# Patient Record
Sex: FEMALE | Race: WHITE | NOT HISPANIC OR LATINO | Employment: UNEMPLOYED
[De-identification: names, ages, dates, MRNs, and addresses within clinical notes are randomized per-mention and may not be internally consistent; named-entity substitution may affect disease eponyms.]

---

## 2021-03-09 ENCOUNTER — TELEMEDICINE (OUTPATIENT)
Dept: TELEHEALTH | Facility: TELEMEDICINE | Age: 36
End: 2021-03-09
Payer: OTHER MISCELLANEOUS

## 2021-03-09 DIAGNOSIS — L24.9 IRRITANT CONTACT DERMATITIS, UNSPECIFIED TRIGGER: ICD-10-CM

## 2021-03-09 PROBLEM — F32.A DEPRESSION: Status: ACTIVE | Noted: 2021-03-09

## 2021-03-09 PROCEDURE — 99203 OFFICE O/P NEW LOW 30 MIN: CPT | Mod: 95,CR | Performed by: PHYSICIAN ASSISTANT

## 2021-03-09 RX ORDER — TRIAMCINOLONE ACETONIDE 1 MG/G
CREAM TOPICAL
Qty: 28.4 G | Refills: 1 | Status: CANCELLED | OUTPATIENT
Start: 2021-03-09

## 2021-03-09 RX ORDER — DESOXIMETASONE 2.5 MG/G
CREAM TOPICAL
Qty: 60 G | Refills: 1 | Status: SHIPPED | OUTPATIENT
Start: 2021-03-09

## 2021-03-09 NOTE — PROGRESS NOTES
Virtual Visit: New Patient   This visit was conducted via Zoom using secure and encrypted videoconferencing technology. The patient was in a private location in the state of [place].    The patient's identity was confirmed and verbal consent was obtained for this virtual visit.    Subjective:     CC: Dry, cracking skin to hands    Nita Connell is a 35 y.o. female presenting to establish care and to discuss the evaluation and management of:    Patient contacts clinic for medication refill noting many years of symptoms of dermatitis to hands.  She describes unknown triggers that she associates with dry scaling itchy skin various areas of hands.  She states this is associated with cracking and discomfort.  She tends to associate this with poor diet as well as alcohol consumption admits to recent poor diet and drinking of wine.  She denies specific exposures to products or specific triggers she is reacted to in the past.  She denies having profession involving glove wearing or repetitive handwashing.  She denies topical products that exacerbate this condition.  She has used Topicort historically with good results and requests a refill of medication.    ROS  See HPI  Constitutional: Negative for fever, chills and malaise/fatigue.   HENT: Negative for congestion.    Eyes: Negative for pain.   Respiratory: Negative for cough and shortness of breath.    Cardiovascular: Negative for leg swelling.   Gastrointestinal: Negative for nausea, vomiting, abdominal pain and diarrhea.   Genitourinary: Negative for dysuria and hematuria.   Skin: Positive for rash.   Neurological: Negative for dizziness, focal weakness and headaches.   Endo/Heme/Allergies: Does not bruise/bleed easily.   Psychiatric/Behavioral: Negative for depression.  The patient is not nervous/anxious.      Allergies   Allergen Reactions   • Hydrocodone Unspecified and Vomiting     hallucination         Current medicines (including changes today)  Current Outpatient  Medications   Medication Sig Dispense Refill   • desoximetasone (TOPICORT) 0.25 % Cream Apply to affected area BID 60 g 1   • levonorgestrel (MIRENA) 52 mg (20 mcg/24 hr) IUD by Intrauterine route.       No current facility-administered medications for this visit.       She  has no past medical history on file.  She  has no past surgical history on file.      History reviewed. No pertinent family history.  No family status information on file.       Patient Active Problem List    Diagnosis Date Noted   • Depression 03/09/2021   • Encounter for routine gynecological examination 07/31/2012   • IUD check up 07/31/2012          Objective:   There were no vitals taken for this visit.    Physical Exam:  Constitutional: Alert, no distress, well-groomed.  Skin: Patient is able to demonstrate cracking, scaling skin about palm and on dorsum of bilateral hands.  Eye: Round. Conjunctiva clear, lids normal. No icterus.   ENMT: Lips pink without lesions, good dentition, moist mucous membranes. Phonation normal.  Neck: No masses, no thyromegaly. Moves freely without pain.  Respiratory: Unlabored respiratory effort, no cough or audible wheeze  Psych: Alert and oriented x3, normal affect and mood.       Assessment and Plan:   The following treatment plan was discussed:     1. Irritant contact dermatitis, unspecified trigger  - desoximetasone (TOPICORT) 0.25 % Cream; Apply to affected area BID  Dispense: 60 g; Refill: 1    Other orders  - levonorgestrel (MIRENA) 52 mg (20 mcg/24 hr) IUD; by Intrauterine route.    Supportive care is reviewed with patient/caregiver - recommend to push PO fluids and electrolytes, focus on healthy diet and minimal alcohol, Topicort Rx sent to pharmacy, alternate with hypoallergenic emollient  Come to clinic with lack of resolution or progression of symptoms.      Follow-up: Follow up for failure of sx to resolve or with any questions or concerns.